# Patient Record
Sex: FEMALE | Race: WHITE | NOT HISPANIC OR LATINO | URBAN - METROPOLITAN AREA
[De-identification: names, ages, dates, MRNs, and addresses within clinical notes are randomized per-mention and may not be internally consistent; named-entity substitution may affect disease eponyms.]

---

## 2022-12-27 ENCOUNTER — PATIENT OUTREACH (OUTPATIENT)
Dept: ONCOLOGY | Facility: CLINIC | Age: 37
End: 2022-12-27

## 2022-12-27 ENCOUNTER — TRANSCRIBE ORDERS (OUTPATIENT)
Dept: OTHER | Age: 37
End: 2022-12-27

## 2022-12-27 DIAGNOSIS — N83.209 CYST OF OVARY, UNSPECIFIED LATERALITY: Primary | ICD-10-CM

## 2022-12-27 NOTE — PROGRESS NOTES
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  Normal appearing uterus and endometrium with possible echogenic   calcifications.   2.  Normal right ovary.   3.  Enlarged left ovary with multicystic areas noted measuring 18.0 x 12.8   x 8.5 cm.  Volume of the ovary is 1023 cm3.  There is no cul-de-sac fluid.    12/5 path/cytology:  Final Diagnosis   [facility]   A. LEFT OVARY, OOPHORECTOMY  --ADULT GRANULOSA CELL TUMOR  --SEE COMMENT     Diagnosis/Interpretation     A. PERITONEAL FLUID, WASHING  --NO TUMOR CELLS IDENTIFIED        12/5 Op note:  Laparoscopy, Diagnostic, Gyn, Left Salpingo-Oophorectomy, Possible Right Salpingectomy, Possible Laparotomy  -- BOOKMARKED      Clinical Assessment / Barriers to Care (Per Nurse):  Pt lives in [facility]    Records Location: Care Everywhere     Records Needed:   Imaging, path from [facility]    Additional testing needed prior to consult:   N/A    Referral updates and Plan:   OUTGOING CALL to pt:  Introduced my role as nurse navigator with [facility] Hematology/Oncology dept and that we have recd the referral for dx of Granulosa cell tumor of left ovary from Dr Gee  Pt confirms they are aware of the referral and ready to schedule  Provided contact information if future questions arise.     -Transferred pt to NPS line [phone] to schedule appt per scheduling instructions.      Melissa Goldstein, WILMAN, RN, PHN, OCN  Hematology/Oncology Nurse  [place]  [phone]

## 2023-01-02 NOTE — TELEPHONE ENCOUNTER
RECORDS STATUS - ALL OTHER DIAGNOSIS      RECORDS RECEIVED FROM: [facility]   DATE RECEIVED:    NOTES STATUS DETAILS   [facility]   OPERATIVE REPORT [facility] 22: Laparoscopy, Diagnostic, Gyn, Left Salpingo-Oophorectomy   MEDICATION LIST -[facility]    LABS     PATHOLOGY REPORTS Req -[facility]  [employer] 22: [id number]   ANYTHING RELATED TO DIAGNOSIS -[facility] Most recent 10/05/22   IMAGING (NEED IMAGES & REPORT)     ULTRASOUND PACS 10/11/22: US Pelvic

## 2023-01-03 NOTE — PROGRESS NOTES
GYNECOLOGIC  ONCOLOGY CONSULT        Referring provider:    Nir Gee  [facility]  [address] [place] [address]   RE: Darcie Cason  : 1985  YAHIR: 2023    CC: Adult Granulosa Cell Tumor    HPI: Ms Darcie Cason is a 37 year old female who presents for consultation regarding newly diagnosed granulosa cell tumor of ovary.    She presents today with her mother and .  She had a year of amenorrhea and noted the ovarian cyst increased in size between May and 2022. Since the recent surgery, her menstrual cycle returned, she is at the end of her cycle. She feels like she is recovering well from surgery, no pain. She feels good about her fertility and is currently not planning for pregnancy, she does want to retain ovarian function if it is safe    She presented to Dr. Corley (OB/GYN) for concerns of amenorrhea and a pelvic mass in 10/222. She had a LMP in 2021.     2022 Pelvic US- 12.7 x 12.3 cm complex left ovarian mass, endometrial thickness of 12.5 mm.    10/11/22 Pelvic US - 18 x 12.8 x 8.5 cm    22 Surgery: Laparoscopy, Diagnostic, Gyn, Left Salpingo-Oophorectomy ( intraoperative finding of smooth exterior, the mass was drained with needle drain, cut in two and removed via endo catch bag)   Cytology: Negative for malignancy  Pathology: Final Diagnosis  A. LEFT OVARY, OOPHORECTOMY  --ADULT GRANULOSA CELL TUMOR  --SEE COMMENT      OBGYN history and Health Maintenance:    Last Pap Smear: no abnormal history      Review of Systems:  Systemic:No weight changes.    Skin : No skin changes or new lesions.   Eye : No changes in vision.   Pulmonary: No cough or SOB.   Cardiovascular: No CP or palpitations  Gastrointestinal : No diarrhea, constipation, abdominal pain. Bowel habits normal.   Genitourinary: No dysuria, urgency or bleeding  Psychiatric: No depression or anxiety  Hematologic : No palpable lymph nodes.   Endocrine : No hot  "flashes. No heat/cold intolerance.      Neurological: No headaches, no numbness.     No past medical history on file.    Past Surgical History:   Procedure Laterality Date     OTHER SURGICAL HISTORY      04604.0,PAST SURGICAL HISTORY,Unremarkable          No current outpatient medications on file.         Not on File    Social History:  Social History     Tobacco Use     Smoking status: Not on file     Smokeless tobacco: Not on file   Substance Use Topics     Alcohol use: Not on file         Family History:   The patient's family history is notable for   Family History   Problem Relation Age of Onset     Genetic Disorder Other         Genetic     Family History Negative Mother         Good Health     Family History Negative Father         Good Health     Family History Negative Other         Good Health         Physical Exam:     /83 (BP Location: Right arm, Patient Position: Sitting, Cuff Size: Adult Regular)   Pulse 85   Temp 98.3  F (36.8  C) (Oral)   Resp 16   Ht 1.724 m (5' 7.87\")   Wt 58.3 kg (128 lb 9.6 oz)   SpO2 100%   BMI 19.63 kg/m    Body mass index is 19.63 kg/m .    General: Alert and oriented, no acute distress  Psych: Mood stable  Cardiovascular: RRR, no murmors  Pulmonary: Lungs clear . Normal respiratory effort  GI: No distention. No masses. No hernia. Small incision, healing, no erythema  : Normal external genitalia. Vagina and cervix without lesions    Endometrial Biopsy was performed as follows: A speculum placed in   vagina with good visualization of cervix; cervix swabbed x3 with   Betadine solution. Anterior lip of cervix grasped with single   toothed tenaculum; endometrial sampling achieved with Pipelle   sampling unit. Tissue collected, labelled and sent to Pathology.   Instruments removed, hemostasis achieved with ease. The patient   tolerated the procedure well.      Assessment: Darcie Cason is a 37 year old woman with a new diagnosis of presumed Stage IC1 granulosa cell " tumor of ovary. She is recovering well from recent surgery.    I reviewed that granulosa cell tumor overall has good prognosis. Additional surgery maybe warranted depending on additional testing. If there is no evidence of metastasis, it is reasonable to retain the contralateral ovary and uterus.    As there is an increased risk of endometrial hyperplasia/atypia- we obtained an endometrial biopsy today      Plan:     1.)   Labs today- Inhibin B, endometrial biopsy and Ct scan of abdomen and pelvis. Follow up in 2-3 weeks to review results. Will also obtain outside pathology review. We will discuss benefits and risks of surgery at next visit.      2.) Genetic risk factors were assessed: she is interested in genetic counseling as both her parents passed away from cancer       Ashley Rodgers M.D., MPH,  F.A.C.O.G.  Professor  Department of Ob/Gyn and Women's Health  Division of Gynecologic Oncology  [facility]  [phone]        Time: total time spent today, January 5, 2023, including preparation, review of outside records, face to face counseling, and documentation was 60 minutes.

## 2023-01-05 ENCOUNTER — ONCOLOGY VISIT (OUTPATIENT)
Dept: ONCOLOGY | Facility: CLINIC | Age: 38
End: 2023-01-05
Attending: OBSTETRICS & GYNECOLOGY
Payer: COMMERCIAL

## 2023-01-05 ENCOUNTER — PRE VISIT (OUTPATIENT)
Dept: ONCOLOGY | Facility: CLINIC | Age: 38
End: 2023-01-05

## 2023-01-05 ENCOUNTER — PATIENT OUTREACH (OUTPATIENT)
Dept: ONCOLOGY | Facility: CLINIC | Age: 38
End: 2023-01-05

## 2023-01-05 VITALS
TEMPERATURE: 98.3 F | DIASTOLIC BLOOD PRESSURE: 83 MMHG | OXYGEN SATURATION: 100 % | SYSTOLIC BLOOD PRESSURE: 139 MMHG | HEIGHT: 68 IN | BODY MASS INDEX: 19.49 KG/M2 | RESPIRATION RATE: 16 BRPM | WEIGHT: 128.6 LBS | HEART RATE: 85 BPM

## 2023-01-05 DIAGNOSIS — N93.8 DYSFUNCTIONAL UTERINE BLEEDING: ICD-10-CM

## 2023-01-05 DIAGNOSIS — N83.209 CYST OF OVARY, UNSPECIFIED LATERALITY: ICD-10-CM

## 2023-01-05 DIAGNOSIS — D39.10 PRIMARY GRANULOSA CELL TUMOR OF OVARY, UNSPECIFIED LATERALITY: Primary | ICD-10-CM

## 2023-01-05 PROCEDURE — G0463 HOSPITAL OUTPT CLINIC VISIT: HCPCS

## 2023-01-05 PROCEDURE — 36415 COLL VENOUS BLD VENIPUNCTURE: CPT | Performed by: OBSTETRICS & GYNECOLOGY

## 2023-01-05 PROCEDURE — G0463 HOSPITAL OUTPT CLINIC VISIT: HCPCS | Performed by: OBSTETRICS & GYNECOLOGY

## 2023-01-05 PROCEDURE — 99205 OFFICE O/P NEW HI 60 MIN: CPT | Performed by: OBSTETRICS & GYNECOLOGY

## 2023-01-05 PROCEDURE — 88305 TISSUE EXAM BY PATHOLOGIST: CPT | Mod: 26 | Performed by: PATHOLOGY

## 2023-01-05 PROCEDURE — 88305 TISSUE EXAM BY PATHOLOGIST: CPT | Mod: TC | Performed by: OBSTETRICS & GYNECOLOGY

## 2023-01-05 PROCEDURE — 83520 IMMUNOASSAY QUANT NOS NONAB: CPT | Performed by: OBSTETRICS & GYNECOLOGY

## 2023-01-05 NOTE — NURSING NOTE
Labs drawn on patient per provider request. Patient tolerated blood draw without any incident.     See Flowsheets.       Cindi Smart (Shama), LPN January 5, 2023 12:57 PM

## 2023-01-05 NOTE — PATIENT INSTRUCTIONS
Diagnosis: Adult Granulosa Cell Tumor    Plan:    We did a biopsy of the uterus (endometrial biopsy today), I will discuss the results with you during follow up visit  Lab- Inhibin B today  Schedule a CT scan of Abdomen and Pelvis on the week of January 16th  Follow up for virtual clinic on January 26th

## 2023-01-07 LAB — INHIBIN B SERPL-MCNC: 13 PG/ML

## 2023-01-09 NOTE — TELEPHONE ENCOUNTER
Action    Action Taken 1/9/23  Slides from Cedric PAGE received, taken to 5th floor lab @ [facility]  3:20 PM

## 2023-01-11 LAB
PATH REPORT.COMMENTS IMP SPEC: NORMAL
PATH REPORT.COMMENTS IMP SPEC: NORMAL
PATH REPORT.FINAL DX SPEC: NORMAL
PATH REPORT.GROSS SPEC: NORMAL
PATH REPORT.MICROSCOPIC SPEC OTHER STN: NORMAL
PATH REPORT.RELEVANT HX SPEC: NORMAL
PHOTO IMAGE: NORMAL

## 2023-01-12 ENCOUNTER — LAB REQUISITION (OUTPATIENT)
Dept: LAB | Facility: CLINIC | Age: 38
End: 2023-01-12
Payer: COMMERCIAL

## 2023-01-12 LAB
PATH REPORT.COMMENTS IMP SPEC: ABNORMAL
PATH REPORT.COMMENTS IMP SPEC: YES
PATH REPORT.FINAL DX SPEC: ABNORMAL
PATH REPORT.GROSS SPEC: ABNORMAL
PATH REPORT.MICROSCOPIC SPEC OTHER STN: ABNORMAL
PATH REPORT.RELEVANT HX SPEC: ABNORMAL
PATH REPORT.RELEVANT HX SPEC: ABNORMAL
PATH REPORT.SITE OF ORIGIN SPEC: ABNORMAL

## 2023-01-12 PROCEDURE — 88321 CONSLTJ&REPRT SLD PREP ELSWR: CPT | Mod: GC | Performed by: PATHOLOGY

## 2023-01-26 ENCOUNTER — PATIENT OUTREACH (OUTPATIENT)
Dept: ONCOLOGY | Facility: CLINIC | Age: 38
End: 2023-01-26

## 2023-01-26 ENCOUNTER — VIRTUAL VISIT (OUTPATIENT)
Dept: ONCOLOGY | Facility: CLINIC | Age: 38
End: 2023-01-26
Attending: OBSTETRICS & GYNECOLOGY
Payer: COMMERCIAL

## 2023-01-26 DIAGNOSIS — D39.10 PRIMARY GRANULOSA CELL TUMOR OF OVARY, UNSPECIFIED LATERALITY: Primary | ICD-10-CM

## 2023-01-26 PROCEDURE — G0463 HOSPITAL OUTPT CLINIC VISIT: HCPCS | Mod: PN,GT | Performed by: OBSTETRICS & GYNECOLOGY

## 2023-01-26 PROCEDURE — 99214 OFFICE O/P EST MOD 30 MIN: CPT | Mod: GT | Performed by: OBSTETRICS & GYNECOLOGY

## 2023-01-26 NOTE — PROGRESS NOTES
Inhibin B order faxed to local team to be completed in 3 months     This information was placed on cover sheet    Saundra Lou RN

## 2023-01-26 NOTE — PROGRESS NOTES
Darcie is a 37 year old who is being evaluated via a billable video visit.      How would you like to obtain your AVS? MyChart  If the video visit is dropped, the invitation should be resent by: Text to cell phone: [phone]  Will anyone else be joining your video visit?  Yes  Supa text link sent    Patient confirms medications and allergies are accurate via patients echeck in completion, and or denies any changes since last reviewed/verified.     GERONIMO Donahue/LPN    Video-Visit Details    Type of service:  Video Visit     Originating Location (pt. Location): Home    Distant Location (provider location):  On-site  Platform used for Video Visit: [facility]      GYNECOLOGIC  ONCOLOGY CLINIC NOTE        Referring provider:    Nir Gee  [facility]  [address] [id number]   RE: Darcie Cason  : 1985  YAHIR: 2023      CC: Adult Granulosa Cell Tumor, likely Stage IC     HPI: Ms Darcie Cason is a 37 year old female who presents for follow up of granulosa cell tumor of left ovary.    Today she reports doing well. Since the surgery she has had two menstrual cycles. No other concerns    Work up to date  She had a year of amenorrhea and noted the ovarian cyst increased in size between May and 2022. Since the recent surgery, her menstrual cycle returned, she is at the end of her cycle. She feels like she is recovering well from surgery, no pain. She feels good about her fertility and is currently not planning for pregnancy, she does want to retain ovarian function if it is safe    She presented to Dr. Corley (OB/GYN) for concerns of amenorrhea and a pelvic mass in 10/222. She had a LMP in 2021.     2022 Pelvic US- 12.7 x 12.3 cm complex left ovarian mass, endometrial thickness of 12.5 mm.    10/11/22 Pelvic US - 18 x 12.8 x 8.5 cm    22 Surgery: Laparoscopy, Diagnostic, Gyn, Left Salpingo-Oophorectomy ( intraoperative  finding of smooth exterior, the mass was drained with needle drain, cut in two and removed via endo catch bag)   Cytology: Negative for malignancy  Pathology: Final Diagnosis  A. LEFT OVARY, OOPHORECTOMY  --ADULT GRANULOSA CELL TUMOR  --SEE COMMENT    23 Inhibin B 13    23 Endometrial Biopsy  Final Diagnosis  Endometrium, biopsy:  - Weakly proliferative endometrium with focal stromal breakdown  - Negative for hyperplasia, atypia or malignancy    23 CT A/P  IMPRESSION:   1. Previously noted left ovarian mass no longer visualized, presumably surgically absent.  Grossly unremarkable appearance of the right ovary. 2. Trace pelvic free fluid likely physiologic.      OBGYN history and Health Maintenance:    Last Pap Smear: no abnormal history      Review of Systems:  Systemic:No weight changes.    Skin : No skin changes or new lesions.   Eye : No changes in vision.   Pulmonary: No cough or SOB.   Cardiovascular: No CP or palpitations  Gastrointestinal : No diarrhea, constipation, abdominal pain. Bowel habits normal.   Genitourinary: No dysuria, urgency or bleeding  Psychiatric: No depression or anxiety  Hematologic : No palpable lymph nodes.   Endocrine : No hot flashes. No heat/cold intolerance.      Neurological: No headaches, no numbness.     No past medical history on file.    Past Surgical History:   Procedure Laterality Date     OTHER SURGICAL HISTORY      02671.0,PAST SURGICAL HISTORY,Unremarkable          No current outpatient medications on file.         No Known Allergies    Social History:  Social History     Tobacco Use     Smoking status: Never     Smokeless tobacco: Never   Substance Use Topics     Alcohol use: Not on file         Family History:   The patient's family history is notable for   Family History   Problem Relation Age of Onset     Leukemia Mother      Cancer Father      Deep Vein Thrombosis (DVT) Father      Family History Negative Other         Good Health     Genetic  Disorder Other         Genetic         Physical Exam:   Virtual Visit  General: Alert and oriented, no acute distress  Psych: Mood stable      Assessment: Darcie Cason is a 37 year old woman with a 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  Her recent work up shows no residual disease.    Discussed that there is small risk of recurrence with granulosa cell tumor. Given her age it is reasonable to retain the right ovary and initiate tumor surveillance visit. Amenorrhea was clinical sign in her original diagnosis so she should follow up with any changes in her menstrual cycle for evaluation.    Plan for Inhibin B level with every visit    Schedule tumor surveillance every 3 months for 2 years and then every 6 months to complete a 5 year follow up.    Plan:     1.)   Follow up in 3 months for tumor surveillance visit     2.) Genetic risk factors were assessed: she is interested in genetic counseling as both her parents passed away from cancer, awaiting further evaluation       Ashley Rodgers M.D., MPH,  F.A.C.O.G.  Professor  Department of Ob/Gyn and Women's Health  Division of Gynecologic Oncology  [facility]  [phone]        Time: total time spent today, January 26, 2023, including preparation, review of outside records, face to face counseling, and documentation was 30 minutes.

## 2023-01-26 NOTE — LETTER
2023         RE: Darcie Cason  [id number]  [facility] [address]        Dear Colleague,    Thank you for referring your patient, Darcie Cason, to the [facility]. Please see a copy of my visit note below      GYNECOLOGIC  ONCOLOGY CLINIC NOTE        Referring provider:    Nir Gee  [facility]  [address] [place] [address]   RE: Darcie Cason  : 1985  YAHIR: 2023      CC: Adult Granulosa Cell Tumor, likely Stage IC     HPI: Ms Darcie Cason is a 37 year old female who presents for follow up of granulosa cell tumor of left ovary.    Today she reports doing well. Since the surgery she has had two menstrual cycles. No other concerns    Work up to date  She had a year of amenorrhea and noted the ovarian cyst increased in size between May and 2022. Since the recent surgery, her menstrual cycle returned, she is at the end of her cycle. She feels like she is recovering well from surgery, no pain. She feels good about her fertility and is currently not planning for pregnancy, she does want to retain ovarian function if it is safe    She presented to Dr. Corley (OB/GYN) for concerns of amenorrhea and a pelvic mass in 10/222. She had a LMP in 2021.     2022 Pelvic US- 12.7 x 12.3 cm complex left ovarian mass, endometrial thickness of 12.5 mm.    10/11/22 Pelvic US - 18 x 12.8 x 8.5 cm    22 Surgery: Laparoscopy, Diagnostic, Gyn, Left Salpingo-Oophorectomy ( intraoperative finding of smooth exterior, the mass was drained with needle drain, cut in two and removed via endo catch bag)   Cytology: Negative for malignancy  Pathology: Final Diagnosis  A. LEFT OVARY, OOPHORECTOMY  --ADULT GRANULOSA CELL TUMOR  --SEE COMMENT    23 Inhibin B 13    23 Endometrial Biopsy  Final Diagnosis  Endometrium, biopsy:  - Weakly proliferative endometrium with focal stromal breakdown  - Negative for hyperplasia, atypia or  malignancy    23 CT A/P  IMPRESSION:   1. Previously noted left ovarian mass no longer visualized, presumably surgically absent.  Grossly unremarkable appearance of the right ovary. 2. Trace pelvic free fluid likely physiologic.      OBGYN history and Health Maintenance:    Last Pap Smear: no abnormal history      Review of Systems:  Systemic:No weight changes.    Skin : No skin changes or new lesions.   Eye : No changes in vision.   Pulmonary: No cough or SOB.   Cardiovascular: No CP or palpitations  Gastrointestinal : No diarrhea, constipation, abdominal pain. Bowel habits normal.   Genitourinary: No dysuria, urgency or bleeding  Psychiatric: No depression or anxiety  Hematologic : No palpable lymph nodes.   Endocrine : No hot flashes. No heat/cold intolerance.      Neurological: No headaches, no numbness.     No past medical history on file.    Past Surgical History:   Procedure Laterality Date     OTHER SURGICAL HISTORY      72093.0,PAST SURGICAL HISTORY,Unremarkable          No current outpatient medications on file.         No Known Allergies    Social History:  Social History     Tobacco Use     Smoking status: Never     Smokeless tobacco: Never   Substance Use Topics     Alcohol use: Not on file         Family History:   The patient's family history is notable for   Family History   Problem Relation Age of Onset     Leukemia Mother      Cancer Father      Deep Vein Thrombosis (DVT) Father      Family History Negative Other         Good Health     Genetic Disorder Other         Genetic         Physical Exam:   Virtual Visit  General: Alert and oriented, no acute distress  Psych: Mood stable      Assessment: Darcie Cason is a 37 year old woman with a 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  Her recent work up shows no residual disease.    Discussed that there is small risk of recurrence with granulosa cell tumor. Given her age it is reasonable to retain the right ovary  and initiate tumor surveillance visit. Amenorrhea was clinical sign in her original diagnosis so she should follow up with any changes in her menstrual cycle for evaluation.    Plan for Inhibin B level with every visit    Schedule tumor surveillance every 3 months for 2 years and then every 6 months to complete a 5 year follow up.    Plan:     1.)   Follow up in 3 months for tumor surveillance visit     2.) Genetic risk factors were assessed: she is interested in genetic counseling as both her parents passed away from cancer, awaiting further evaluation       Ashley Rodgers M.D., MPH,  F.A.C.O.G.  Professor  Department of Ob/Gyn and Women's Health  Division of Gynecologic Oncology  [facility]/[employer] [place]  [phone]      Time: total time spent today, January 26, 2023, including preparation, review of outside records, face to face counseling, and documentation was 30 minutes.

## 2023-01-28 ENCOUNTER — HEALTH MAINTENANCE LETTER (OUTPATIENT)
Age: 38
End: 2023-01-28

## 2023-04-27 ENCOUNTER — VIRTUAL VISIT (OUTPATIENT)
Dept: ONCOLOGY | Facility: CLINIC | Age: 38
End: 2023-04-27
Attending: OBSTETRICS & GYNECOLOGY
Payer: COMMERCIAL

## 2023-04-27 DIAGNOSIS — R97.8 ELEVATED TUMOR MARKERS: Primary | ICD-10-CM

## 2023-04-27 DIAGNOSIS — D39.10 PRIMARY GRANULOSA CELL TUMOR OF OVARY, UNSPECIFIED LATERALITY: ICD-10-CM

## 2023-04-27 PROCEDURE — 99213 OFFICE O/P EST LOW 20 MIN: CPT | Mod: VID | Performed by: OBSTETRICS & GYNECOLOGY

## 2023-04-27 PROCEDURE — G0463 HOSPITAL OUTPT CLINIC VISIT: HCPCS | Mod: PN,GT | Performed by: OBSTETRICS & GYNECOLOGY

## 2023-04-27 NOTE — PROGRESS NOTES
Virtual Visit Details    Type of service:  Video Visit     Originating Location (pt. Location): Home    Distant Location (provider location):  On-site  Platform used for Video Visit: [facility]      GYNECOLOGIC  ONCOLOGY CLINIC NOTE        Referring provider:    Nir Gee  [facility]  [address] [place] [address]   RE: Darcie Cason  : 1985  YAHIR: 2023    CC: Adult Granulosa Cell Tumor, likely Stage IC     HPI: Ms Darcie Cason is a 38 year old female who presents for follow up of granulosa cell tumor of left ovary.    Today she reports doing well, LMP was early April, cycles last about 27-28 days. Denies any abnormal vaginal bleeding or pain. No other concerns    Work up to date  She had a year of amenorrhea and noted the ovarian cyst increased in size between May and 2022. Since the recent surgery, her menstrual cycle returned, she is at the end of her cycle. She feels like she is recovering well from surgery, no pain. She feels good about her fertility and is currently not planning for pregnancy, she does want to retain ovarian function if it is safe    She presented to Dr. Corley (OB/GYN) for concerns of amenorrhea and a pelvic mass in 10/222. She had a LMP in 2021.     2022 Pelvic US- 12.7 x 12.3 cm complex left ovarian mass, endometrial thickness of 12.5 mm.    10/11/22 Pelvic US - 18 x 12.8 x 8.5 cm    22 Surgery: Laparoscopy, Diagnostic, Gyn, Left Salpingo-Oophorectomy ( intraoperative finding of smooth exterior, the mass was drained with needle drain, cut in two and removed via endo catch bag)   Cytology: Negative for malignancy  Pathology: Final Diagnosis  A. LEFT OVARY, OOPHORECTOMY  --ADULT GRANULOSA CELL TUMOR  --SEE COMMENT    23 Inhibin B 13    23 Endometrial Biopsy  Final Diagnosis  Endometrium, biopsy:  - Weakly proliferative endometrium with focal stromal breakdown  - Negative for hyperplasia, atypia or  malignancy    23 CT A/P  IMPRESSION:   1. Previously noted left ovarian mass no longer visualized, presumably surgically absent.  Grossly unremarkable appearance of the right ovary. 2. Trace pelvic free fluid likely physiologic.    23 Inhibin B 72        OBGYN history and Health Maintenance:    Last Pap Smear: no abnormal history    Review of Systems:  Systemic:No weight changes.    Skin : No skin changes or new lesions.   Eye : No changes in vision.   Pulmonary: No cough or SOB.   Cardiovascular: No CP or palpitations  Gastrointestinal : No diarrhea, constipation, abdominal pain. Bowel habits normal.   Genitourinary: No dysuria, urgency or bleeding  Psychiatric: No depression or anxiety  Hematologic : No palpable lymph nodes.   Endocrine : No hot flashes. No heat/cold intolerance.      Neurological: No headaches, no numbness.     No past medical history on file.    Past Surgical History:   Procedure Laterality Date     OTHER SURGICAL HISTORY      17489.0,PAST SURGICAL HISTORY,Unremarkable       No current outpatient medications on file.       No Known Allergies    Social History:  Social History     Tobacco Use     Smoking status: Never     Smokeless tobacco: Never   Vaping Use     Vaping status: Not on file   Substance Use Topics     Alcohol use: Not on file         Family History:   The patient's family history is notable for   Family History   Problem Relation Age of Onset     Leukemia Mother      Cancer Father      Deep Vein Thrombosis (DVT) Father      Family History Negative Other         Good Health     Genetic Disorder Other         Genetic         Physical Exam:   Virtual Visit  General: Alert and oriented, no acute distress  Psych: Mood stable      Assessment: Darcie Cason is a 37 year old woman with a 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    Reviewed that Inhibin B in pre-menopausal women can fluctuate and when elevated we follow a trend.    Plan to obtain  Pelvic US and Inhibin value in about 3 weeks which will be post her next menstural cycle and virtual visit in 4 weeks to discuss results.     Genetic risk factors were assessed: will discuss further at next visit       Ashley Rodgers M.D., MPH,  F.A.C.O.G.  Professor  Department of Ob/Gyn and Women's Health  Division of Gynecologic Oncology  [facility]/[employer] [place]  [phone]        Time: total time spent today, April 27, 2023, including preparation, review of outside records, face to face counseling, and documentation was 25 minutes.

## 2023-04-27 NOTE — NURSING NOTE
Is the patient currently in the state of [place]? YES    Visit mode:VIDEO    If the visit is dropped, the patient can be reconnected by: VIDEO VISIT: Text to cell phone: [phone]    Will anyone else be joining the visit? NO      How would you like to obtain your AVS? MyChart    Are changes needed to the allergy or medication list? NO    Reason for visit: Video Visit (Return CCSL)      Radha Suazo VF

## 2023-04-27 NOTE — LETTER
2023         RE: Darcie Cason  [id number]  [facility] [address]        Dear Colleague,    Thank you for referring your patient, Darcie Cason, to the [facility]. Please see a copy of my visit note below.          GYNECOLOGIC  ONCOLOGY CLINIC NOTE        Referring provider:    Nir Gee  [facility]  [address] [place] [address]   RE: Darcie Cason  : 1985  YAHIR: 2023    CC: Adult Granulosa Cell Tumor, likely Stage IC     HPI: Ms Darcie Cason is a 38 year old female who presents for follow up of granulosa cell tumor of left ovary.    Today she reports doing well, LMP was early April, cycles last about 27-28 days. Denies any abnormal vaginal bleeding or pain. No other concerns    Work up to date  She had a year of amenorrhea and noted the ovarian cyst increased in size between May and 2022. Since the recent surgery, her menstrual cycle returned, she is at the end of her cycle. She feels like she is recovering well from surgery, no pain. She feels good about her fertility and is currently not planning for pregnancy, she does want to retain ovarian function if it is safe    She presented to Dr. Corley (OB/GYN) for concerns of amenorrhea and a pelvic mass in 10/222. She had a LMP in 2021.     2022 Pelvic US- 12.7 x 12.3 cm complex left ovarian mass, endometrial thickness of 12.5 mm.    10/11/22 Pelvic US - 18 x 12.8 x 8.5 cm    22 Surgery: Laparoscopy, Diagnostic, Gyn, Left Salpingo-Oophorectomy ( intraoperative finding of smooth exterior, the mass was drained with needle drain, cut in two and removed via endo catch bag)   Cytology: Negative for malignancy  Pathology: Final Diagnosis  A. LEFT OVARY, OOPHORECTOMY  --ADULT GRANULOSA CELL TUMOR  --SEE COMMENT    23 Inhibin B 13    23 Endometrial Biopsy  Final Diagnosis  Endometrium, biopsy:  - Weakly proliferative endometrium with focal stromal  breakdown  - Negative for hyperplasia, atypia or malignancy    23 CT A/P  IMPRESSION:   1. Previously noted left ovarian mass no longer visualized, presumably surgically absent.  Grossly unremarkable appearance of the right ovary. 2. Trace pelvic free fluid likely physiologic.    23 Inhibin B 72        OBGYN history and Health Maintenance:    Last Pap Smear: no abnormal history    Review of Systems:  Systemic:No weight changes.    Skin : No skin changes or new lesions.   Eye : No changes in vision.   Pulmonary: No cough or SOB.   Cardiovascular: No CP or palpitations  Gastrointestinal : No diarrhea, constipation, abdominal pain. Bowel habits normal.   Genitourinary: No dysuria, urgency or bleeding  Psychiatric: No depression or anxiety  Hematologic : No palpable lymph nodes.   Endocrine : No hot flashes. No heat/cold intolerance.      Neurological: No headaches, no numbness.     No past medical history on file.    Past Surgical History:   Procedure Laterality Date    OTHER SURGICAL HISTORY      35880.0,PAST SURGICAL HISTORY,Unremarkable       No current outpatient medications on file.       No Known Allergies    Social History:  Social History     Tobacco Use    Smoking status: Never    Smokeless tobacco: Never   Vaping Use    Vaping status: Not on file   Substance Use Topics    Alcohol use: Not on file         Family History:   The patient's family history is notable for   Family History   Problem Relation Age of Onset    Leukemia Mother     Cancer Father     Deep Vein Thrombosis (DVT) Father     Family History Negative Other         Good Health    Genetic Disorder Other         Genetic         Physical Exam:   Virtual Visit  General: Alert and oriented, no acute distress  Psych: Mood stable      Assessment: Darcie Cason is a 37 year old woman with a 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    Reviewed that Inhibin B in pre-menopausal women can fluctuate and when  elevated we follow a trend.    Plan to obtain Pelvic US and Inhibin value in about 3 weeks which will be post her next menstural cycle and virtual visit in 4 weeks to discuss results.     Genetic risk factors were assessed: will discuss further at next visit       Ashley Rodgers M.D., MPH,  F.A.C.O.G.  Professor  Department of Ob/Gyn and Women's Health  Division of Gynecologic Oncology  [facility]  [phone]        Time: total time spent today, April 27, 2023, including preparation, review of outside records, face to face counseling, and documentation was 25 minutes.

## 2023-05-23 NOTE — PROGRESS NOTES
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    Today she reports doing well, thinks her LMP will in 3-4 days.  Denies any abnormal vaginal bleeding or pain. No other concerns    Work up to date  She had a year of amenorrhea and noted the ovarian cyst increased in size between May and 2022. Since the recent surgery, her menstrual cycle returned, she is at the end of her cycle. She feels like she is recovering well from surgery, no pain. She feels good about her fertility and is currently not planning for pregnancy, she does want to retain ovarian function if it is safe    She presented to Dr. Corley (OB/GYN) for concerns of amenorrhea and a pelvic mass in 10/222. She had a LMP in 2021.     2022 Pelvic US- 12.7 x 12.3 cm complex left ovarian mass, endometrial thickness of 12.5 mm.    10/11/22 Pelvic US - 18 x 12.8 x 8.5 cm    22 Surgery: Laparoscopy, Diagnostic, Gyn, Left Salpingo-Oophorectomy ( intraoperative finding of smooth exterior, the mass was drained with needle drain, cut in two and removed via endo catch bag)   Cytology: Negative for malignancy  Pathology: Final Diagnosis  A. LEFT OVARY, OOPHORECTOMY  --ADULT GRANULOSA CELL TUMOR  --SEE COMMENT    23 Inhibin B 13    23 Endometrial Biopsy  Final Diagnosis  Endometrium, biopsy:  - Weakly proliferative endometrium with focal stromal breakdown  - Negative for hyperplasia, atypia or malignancy    23 CT A/P  IMPRESSION:   1. Previously noted left ovarian mass no longer visualized, presumably surgically absent.  Grossly unremarkable appearance of the right ovary. 2. Trace pelvic free fluid likely  physiologic.    23 Inhibin B 72     23 Pelvic US  Right Ovary 3.7 x 3.8 x 2.8 cm, two complex cyst 1.8 x 1.7 cm and 2.3 x 2 cm     23 Inhibin B 12      OBGYN history and Health Maintenance:    Last Pap Smear: no abnormal history    Review of Systems:  Systemic:No weight changes.    Skin : No skin changes or new lesions.   Eye : No changes in vision.   Pulmonary: No cough or SOB.   Cardiovascular: No CP or palpitations  Gastrointestinal : No diarrhea, constipation, abdominal pain. Bowel habits normal.   Genitourinary: No dysuria, urgency or bleeding  Psychiatric: No depression or anxiety  Hematologic : No palpable lymph nodes.   Endocrine : No hot flashes. No heat/cold intolerance.      Neurological: No headaches, no numbness.     No past medical history on file.    Past Surgical History:   Procedure Laterality Date     OTHER SURGICAL HISTORY      17324.0,PAST SURGICAL HISTORY,Unremarkable       No current outpatient medications on file.       No Known Allergies    Social History:  Social History     Tobacco Use     Smoking status: Never     Smokeless tobacco: Never   Vaping Use     Vaping status: Not on file   Substance Use Topics     Alcohol use: Not on file         Family History:   The patient's family history is notable for   Family History   Problem Relation Age of Onset     Leukemia Mother      Cancer Father      Deep Vein Thrombosis (DVT) Father      Family History Negative Other         Good Health     Genetic Disorder Other         Genetic         Physical Exam:   Virtual Visit  General: Alert and oriented, no acute distress  Psych: Mood stable      Assessment: Darcie Cason is a 38 year old woman with  diagnosis of 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    Repeat Inhibin B level is within normal value an Pelvic US shows likely functional cyst.    Follow up in 6 months with Inhibin B        Ashley Rodgers M.D., MPH,  F.A.C.O.G.  Professor  Department of Ob/Gyn and Women's  Health  Division of Gynecologic Oncology  [facility]  [phone]        Time: total time spent today, 5/25/23, including preparation, review of outside records, face to face counseling, and documentation was 20 minutes.

## 2023-05-25 ENCOUNTER — VIRTUAL VISIT (OUTPATIENT)
Dept: ONCOLOGY | Facility: CLINIC | Age: 38
End: 2023-05-25
Attending: OBSTETRICS & GYNECOLOGY
Payer: COMMERCIAL

## 2023-05-25 DIAGNOSIS — D39.10 PRIMARY GRANULOSA CELL TUMOR OF OVARY, UNSPECIFIED LATERALITY: Primary | ICD-10-CM

## 2023-05-25 PROCEDURE — 99213 OFFICE O/P EST LOW 20 MIN: CPT | Mod: VID | Performed by: OBSTETRICS & GYNECOLOGY

## 2023-05-25 NOTE — LETTER
2023         RE: Darcie Cason  [id number]  [facility] [address]        Dear Colleague,    Thank you for referring your patient, Darcie Cason, to the [facility] CANCER [facility]INIC. Please see a copy of my visit note below.    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    Today she reports doing well, thinks her LMP will in 3-4 days.  Denies any abnormal vaginal bleeding or pain. No other concerns    Work up to date  She had a year of amenorrhea and noted the ovarian cyst increased in size between May and 2022. Since the recent surgery, her menstrual cycle returned, she is at the end of her cycle. She feels like she is recovering well from surgery, no pain. She feels good about her fertility and is currently not planning for pregnancy, she does want to retain ovarian function if it is safe    She presented to Dr. Corley (OB/GYN) for concerns of amenorrhea and a pelvic mass in 10/222. She had a LMP in 2021.     2022 Pelvic US- 12.7 x 12.3 cm complex left ovarian mass, endometrial thickness of 12.5 mm.    10/11/22 Pelvic US - 18 x 12.8 x 8.5 cm    22 Surgery: Laparoscopy, Diagnostic, Gyn, Left Salpingo-Oophorectomy ( intraoperative finding of smooth exterior, the mass was drained with needle drain, cut in two and removed via endo catch bag)   Cytology: Negative for malignancy  Pathology: Final Diagnosis  A. LEFT OVARY, OOPHORECTOMY  --ADULT GRANULOSA CELL TUMOR  --SEE COMMENT    23 Inhibin B 13    23 Endometrial Biopsy  Final Diagnosis  Endometrium, biopsy:  - Weakly proliferative endometrium with focal stromal breakdown  - Negative for  hyperplasia, atypia or malignancy    23 CT A/P  IMPRESSION:   1. Previously noted left ovarian mass no longer visualized, presumably surgically absent.  Grossly unremarkable appearance of the right ovary. 2. Trace pelvic free fluid likely physiologic.    23 Inhibin B 72     23 Pelvic US  Right Ovary 3.7 x 3.8 x 2.8 cm, two complex cyst 1.8 x 1.7 cm and 2.3 x 2 cm     23 Inhibin B 12      OBGYN history and Health Maintenance:    Last Pap Smear: no abnormal history    Review of Systems:  Systemic:No weight changes.    Skin : No skin changes or new lesions.   Eye : No changes in vision.   Pulmonary: No cough or SOB.   Cardiovascular: No CP or palpitations  Gastrointestinal : No diarrhea, constipation, abdominal pain. Bowel habits normal.   Genitourinary: No dysuria, urgency or bleeding  Psychiatric: No depression or anxiety  Hematologic : No palpable lymph nodes.   Endocrine : No hot flashes. No heat/cold intolerance.      Neurological: No headaches, no numbness.     No past medical history on file.    Past Surgical History:   Procedure Laterality Date    OTHER SURGICAL HISTORY      38603.0,PAST SURGICAL HISTORY,Unremarkable       No current outpatient medications on file.       No Known Allergies    Social History:  Social History     Tobacco Use    Smoking status: Never    Smokeless tobacco: Never   Vaping Use    Vaping status: Not on file   Substance Use Topics    Alcohol use: Not on file         Family History:   The patient's family history is notable for   Family History   Problem Relation Age of Onset    Leukemia Mother     Cancer Father     Deep Vein Thrombosis (DVT) Father     Family History Negative Other         Good Health    Genetic Disorder Other         Genetic         Physical Exam:   Virtual Visit  General: Alert and oriented, no acute distress  Psych: Mood stable      Assessment: Darcie Cason is a 38 year old woman with  diagnosis of presumed Stage IC1 granulosa cell tumor  of ovary.    Repeat Inhibin B level is within normal value an Pelvic US shows likely functional cyst.    Follow up in 6 months with Inhibin B        Ashley Rodgers M.D., MPH,  F.A.CJosephOLEROY.  Professor  Department of Ob/Gyn and Women's Health  Division of Gynecologic Oncology  [facility]/[place] [place]  [phone]        Time: total time spent today, 5/25/23, including preparation, review of outside records, face to face counseling, and documentation was 20 minutes.       Again, thank you for allowing me to participate in the care of your patient.        Sincerely,        Ashley Rodgers MD

## 2023-05-25 NOTE — NURSING NOTE
Is the patient currently in the state of [place]? YES    Visit mode:VIDEO    If the visit is dropped, the patient can be reconnected by: VIDEO VISIT: Text to cell phone: [phone]    Will anyone else be joining the visit? NO      How would you like to obtain your AVS? MyChart    Are changes needed to the allergy or medication list? NO    Reason for visit: RECHECK    Luis Antonio MELTON

## 2023-07-30 ENCOUNTER — HEALTH MAINTENANCE LETTER (OUTPATIENT)
Age: 38
End: 2023-07-30

## 2023-11-16 ENCOUNTER — VIRTUAL VISIT (OUTPATIENT)
Dept: ONCOLOGY | Facility: CLINIC | Age: 38
End: 2023-11-16
Attending: OBSTETRICS & GYNECOLOGY
Payer: COMMERCIAL

## 2023-11-16 VITALS — HEIGHT: 68 IN | WEIGHT: 127 LBS | BODY MASS INDEX: 19.25 KG/M2

## 2023-11-16 DIAGNOSIS — D39.10 PRIMARY GRANULOSA CELL TUMOR OF OVARY, UNSPECIFIED LATERALITY: Primary | ICD-10-CM

## 2023-11-16 PROCEDURE — 99213 OFFICE O/P EST LOW 20 MIN: CPT | Mod: VID | Performed by: OBSTETRICS & GYNECOLOGY

## 2023-11-16 ASSESSMENT — PAIN SCALES - GENERAL: PAINLEVEL: NO PAIN (0)

## 2023-11-16 NOTE — NURSING NOTE
Is the patient currently in the state of [place]? YES    Visit mode:VIDEO    If the visit is dropped, the patient can be reconnected by: VIDEO VISIT: Text to cell phone:   Telephone Information:   Mobile [phone]       Will anyone else be joining the visit? NO  (If patient encounters technical issues they should call [phone]    How would you like to obtain your AVS? MyChart    Are changes needed to the allergy or medication list? No    Reason for visit: RECHECK    Shelly COTE

## 2023-11-16 NOTE — PROGRESS NOTES
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    Today she reports doing well, LMP in  She notes her cycles are shorter these days, around 25 days, no abnormal spotting.Denies any abnormal vaginal bleeding or pain. No other concerns    Work up to date  She had a year of amenorrhea and noted the ovarian cyst increased in size between May and 2022. Since the recent surgery, her menstrual cycle returned, she is at the end of her cycle. She feels like she is recovering well from surgery, no pain. She feels good about her fertility and is currently not planning for pregnancy, she does want to retain ovarian function if it is safe    She presented to Dr. Corley (OB/GYN) for concerns of amenorrhea and a pelvic mass in 10/222. She had a LMP in 2021.     2022 Pelvic US- 12.7 x 12.3 cm complex left ovarian mass, endometrial thickness of 12.5 mm.    10/11/22 Pelvic US - 18 x 12.8 x 8.5 cm    22 Surgery: Laparoscopy, Diagnostic, Gyn, Left Salpingo-Oophorectomy ( intraoperative finding of smooth exterior, the mass was drained with needle drain, cut in two and removed via endo catch bag)   Cytology: Negative for malignancy  Pathology: Final Diagnosis  A. LEFT OVARY, OOPHORECTOMY  --ADULT GRANULOSA CELL TUMOR  --SEE COMMENT    23 Inhibin B 13    23 Endometrial Biopsy  Final Diagnosis  Endometrium, biopsy:  - Weakly proliferative endometrium with focal stromal breakdown  - Negative for hyperplasia, atypia or malignancy    23 CT A/P  IMPRESSION:   1. Previously noted left ovarian mass no longer visualized, presumably surgically absent.  Grossly  unremarkable appearance of the right ovary. 2. Trace pelvic free fluid likely physiologic.    23 Inhibin B 72     23 Pelvic US  Right Ovary 3.7 x 3.8 x 2.8 cm, two complex cyst 1.8 x 1.7 cm and 2.3 x 2 cm     23 Inhibin B 12  23 Pelvic US  FINDINGS: 2 complex area on the right ovary measuring 1.8 x 1.7 x 1.6 cm and 2.2 x 2 x 1.7 cm. The endometrial stripe is 7.2 mm with some very small hyperechoic areas adjacent to the anterior endometrial cavity.    23 Inhibin B  39      OBGYN history and Health Maintenance:    Last Pap Smear: no abnormal history      No past medical history on file.    Past Surgical History:   Procedure Laterality Date    OTHER SURGICAL HISTORY      84035.0,PAST SURGICAL HISTORY,Unremarkable       No current outpatient medications on file.       No Known Allergies    Social History:  Social History     Tobacco Use    Smoking status: Never    Smokeless tobacco: Never   Substance Use Topics    Alcohol use: Not on file         Family History:   The patient's family history is notable for   Family History   Problem Relation Age of Onset    Leukemia Mother     Cancer Father     Deep Vein Thrombosis (DVT) Father     Family History Negative Other         Good Health    Genetic Disorder Other         Genetic         Physical Exam:   Virtual Visit  General: Alert and oriented, no acute distress  Psych: Mood stable      Assessment: Darcie Cason is a 38 year old woman with  diagnosis of 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    Will obtain Pelvic US to follow up on most recent Inhibin level, likely the number fluctuates due to normal menstrual cycle.    Follow up in 6 months with Inhibin B and Pelvic US at that time      Ashley Rodgers M.D., MPH,  F.A.C.O.G.  Professor  Department of Ob/Gyn and Women's Health  Division of Gynecologic Oncology  [facility]/[employer] [place]  [phone]        Time: total time spent today, 2023, including  preparation, review of outside records, face to face counseling, and documentation was 20 minutes.

## 2023-11-16 NOTE — PROGRESS NOTES
Virtual Visit Details    Type of service:  Video Visit     Originating Location (pt. Location): Home    Distant Location (provider location):  On-site  Platform used for Video Visit: Aleksandra

## 2023-11-16 NOTE — LETTER
2023         RE: Darcie Cason  [id number]  [facility] [address]        Dear Colleague,    Thank you for referring your patient, Darcie Cason, to the [facility] CANCER CLINIC. Please see a copy of my visit note below.    GYNECOLOGIC  ONCOLOGY CLINIC NOTE        Referring provider:    Nir Gee  [facility]  [address] [place] [address]   RE: Darcie Cason  : 1985  YAHIR: 2023      CC: Adult Granulosa Cell Tumor, likely Stage IC     HPI: Ms Darcie Cason is a 38 year old female who presents for follow up of granulosa cell tumor of left ovary.    Today she reports doing well, LMP in  She notes her cycles are shorter these days, around 25 days, no abnormal spotting.Denies any abnormal vaginal bleeding or pain. No other concerns    Work up to date  She had a year of amenorrhea and noted the ovarian cyst increased in size between May and 2022. Since the recent surgery, her menstrual cycle returned, she is at the end of her cycle. She feels like she is recovering well from surgery, no pain. She feels good about her fertility and is currently not planning for pregnancy, she does want to retain ovarian function if it is safe    She presented to Dr. Corley (OB/GYN) for concerns of amenorrhea and a pelvic mass in 10/222. She had a LMP in 2021.     2022 Pelvic US- 12.7 x 12.3 cm complex left ovarian mass, endometrial thickness of 12.5 mm.    10/11/22 Pelvic US - 18 x 12.8 x 8.5 cm    22 Surgery: Laparoscopy, Diagnostic, Gyn, Left Salpingo-Oophorectomy ( intraoperative finding of smooth exterior, the mass was drained with needle drain, cut in two and removed via endo catch bag)   Cytology: Negative for malignancy  Pathology: Final Diagnosis  A. LEFT OVARY, OOPHORECTOMY  --ADULT GRANULOSA CELL TUMOR  --SEE COMMENT    23 Inhibin B 13    23 Endometrial Biopsy  Final Diagnosis  Endometrium, biopsy:  -  Weakly proliferative endometrium with focal stromal breakdown  - Negative for hyperplasia, atypia or malignancy    23 CT A/P  IMPRESSION:   1. Previously noted left ovarian mass no longer visualized, presumably surgically absent.  Grossly unremarkable appearance of the right ovary. 2. Trace pelvic free fluid likely physiologic.    23 Inhibin B 72     23 Pelvic US  Right Ovary 3.7 x 3.8 x 2.8 cm, two complex cyst 1.8 x 1.7 cm and 2.3 x 2 cm     23 Inhibin B 12  23 Pelvic US  FINDINGS: 2 complex area on the right ovary measuring 1.8 x 1.7 x 1.6 cm and 2.2 x 2 x 1.7 cm. The endometrial stripe is 7.2 mm with some very small hyperechoic areas adjacent to the anterior endometrial cavity.    23 Inhibin B  39      OBGYN history and Health Maintenance:    Last Pap Smear: no abnormal history      No past medical history on file.    Past Surgical History:   Procedure Laterality Date    OTHER SURGICAL HISTORY      78913.0,PAST SURGICAL HISTORY,Unremarkable       No current outpatient medications on file.       No Known Allergies    Social History:  Social History     Tobacco Use    Smoking status: Never    Smokeless tobacco: Never   Substance Use Topics    Alcohol use: Not on file         Family History:   The patient's family history is notable for   Family History   Problem Relation Age of Onset    Leukemia Mother     Cancer Father     Deep Vein Thrombosis (DVT) Father     Family History Negative Other         Good Health    Genetic Disorder Other         Genetic         Physical Exam:   Virtual Visit  General: Alert and oriented, no acute distress  Psych: Mood stable      Assessment: Darcie Cason is a 38 year old woman with  diagnosis of 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    Will obtain Pelvic US to follow up on most recent Inhibin level, likely the number fluctuates due to normal menstrual cycle.    Follow up in 6 months with Inhibin B and Pelvic US at that  time      Ashley Rodgers M.D., MPH,  JOSEPH  Professor  Department of Ob/Gyn and Women's Health  Division of Gynecologic Oncology  [facility]/[employer] [place]  [phone]        Time: total time spent today, November 16, 2023, including preparation, review of outside records, face to face counseling, and documentation was 20 minutes.

## 2023-11-16 NOTE — PATIENT INSTRUCTIONS
Schedule a Pelvic US now  Follow up in 6 months with Dr. Rodgers, with new Inhibin B lab and Pelvic US done at that time  Call with any abnormal vaginal bleeding or pelvic pain      Ashley Rodgers MD

## 2023-12-28 ENCOUNTER — PATIENT OUTREACH (OUTPATIENT)
Dept: ONCOLOGY | Facility: CLINIC | Age: 38
End: 2023-12-28
Payer: COMMERCIAL

## 2023-12-28 NOTE — TELEPHONE ENCOUNTER
MD reached out to patient to review US    All  questions answered     Patient will follow up as planned     Saundra Lou RN

## 2024-05-10 ENCOUNTER — ANCILLARY PROCEDURE (OUTPATIENT)
Dept: ULTRASOUND IMAGING | Facility: CLINIC | Age: 39
End: 2024-05-10
Attending: OBSTETRICS & GYNECOLOGY
Payer: COMMERCIAL

## 2024-05-10 ENCOUNTER — LAB (OUTPATIENT)
Dept: LAB | Facility: CLINIC | Age: 39
End: 2024-05-10
Attending: OBSTETRICS & GYNECOLOGY
Payer: COMMERCIAL

## 2024-05-10 DIAGNOSIS — D39.10 PRIMARY GRANULOSA CELL TUMOR OF OVARY, UNSPECIFIED LATERALITY: ICD-10-CM

## 2024-05-10 PROCEDURE — 83520 IMMUNOASSAY QUANT NOS NONAB: CPT | Mod: 90 | Performed by: PATHOLOGY

## 2024-05-10 PROCEDURE — 76856 US EXAM PELVIC COMPLETE: CPT | Mod: GC | Performed by: RADIOLOGY

## 2024-05-10 PROCEDURE — 36415 COLL VENOUS BLD VENIPUNCTURE: CPT | Performed by: PATHOLOGY

## 2024-05-10 PROCEDURE — 76830 TRANSVAGINAL US NON-OB: CPT | Mod: GC | Performed by: RADIOLOGY

## 2024-05-10 PROCEDURE — 99000 SPECIMEN HANDLING OFFICE-LAB: CPT | Performed by: PATHOLOGY

## 2024-05-13 LAB — INHIBIN B SERPL-MCNC: 48 PG/ML

## 2024-05-15 NOTE — PROGRESS NOTES
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    Today she reports doing well, LMP 24.   That cycle was earlier then expected, no intermenstrual bleeding, no pelvic pain. Overall is doing well. She will follow up with her PCP for annual exam including Pap smear.     Work up to date  She had a year of amenorrhea and noted the ovarian cyst increased in size between May and 2022. Since the recent surgery, her menstrual cycle returned, she is at the end of her cycle. She feels like she is recovering well from surgery, no pain. She feels good about her fertility and is currently not planning for pregnancy, she does want to retain ovarian function if it is safe    She presented to Dr. Corley (OB/GYN) for concerns of amenorrhea and a pelvic mass in 10/222. She had a LMP in 2021.     2022 Pelvic US- 12.7 x 12.3 cm complex left ovarian mass, endometrial thickness of 12.5 mm.    10/11/22 Pelvic US - 18 x 12.8 x 8.5 cm    22 Surgery: Laparoscopy, Diagnostic, Gyn, Left Salpingo-Oophorectomy (intraoperative finding of smooth exterior, the mass was drained with needle drain, cut in two and removed via endo catch bag)   Cytology: Negative for malignancy  Pathology: Final Diagnosis  A. LEFT OVARY, OOPHORECTOMY  --ADULT GRANULOSA CELL TUMOR  --SEE COMMENT    23 Inhibin B 13    23 Endometrial Biopsy  Final Diagnosis  Endometrium, biopsy:  - Weakly proliferative endometrium with focal stromal breakdown  - Negative for hyperplasia, atypia or malignancy    23 CT A/P  IMPRESSION:   1. Previously noted left ovarian mass no longer visualized, presumably surgically absent.   Grossly unremarkable appearance of the right ovary. 2. Trace pelvic free fluid likely physiologic.    23 Inhibin B 72     23 Pelvic US  Right Ovary 3.7 x 3.8 x 2.8 cm, two complex cyst 1.8 x 1.7 cm and 2.3 x 2 cm     23 Inhibin B 12  23 Pelvic US  FINDINGS: 2 complex area on the right ovary measuring 1.8 x 1.7 x 1.6 cm and 2.2 x 2 x 1.7 cm. The endometrial stripe is 7.2 mm with some very small hyperechoic areas adjacent to the anterior endometrial cavity.    23 Inhibin B  39    5/10/24 Inhibin B  48  5/10/24 Pelvic US  Normal Pelvic US      OBGYN history and Health Maintenance:    Last Pap Smear: no abnormal history, last one 5 yrs ago, due for screening      Past Medical History:   Diagnosis Date    Granulosa cell tumor        Past Surgical History:   Procedure Laterality Date    OTHER SURGICAL HISTORY      67497.0,PAST SURGICAL HISTORY,Unremarkable       No current outpatient medications on file.       No Known Allergies    Social History:  Social History     Tobacco Use    Smoking status: Never    Smokeless tobacco: Never   Substance Use Topics    Alcohol use: Not on file         Family History:   The patient's family history is notable for   Family History   Problem Relation Age of Onset    Leukemia Mother     Cancer Father     Deep Vein Thrombosis (DVT) Father     Family History Negative Other         Good Health    Genetic Disorder Other         Genetic         Physical Exam:   Virtual Visit  General: Alert and oriented, no acute distress  Psych: Mood stable      Assessment/Plan: Darcie Cason is a 39 year old woman with diagnosis of 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    She continues to do well, with no evidence of disease recurrence.    Most recent Inhibin B was right around her menses and the value has historically fluctuated depending on her cycles.    Follow up with PCP for annual exam and cervical cancer screening    Next visit in 6  months with CT scan abdomen and pelvis and Inhibin B value        Ashley Rodgers M.D., MPH,  F.A.C.O.G.  Division of Gynecologic Oncology  [facility]/[employer] [place]  [phone]      Time: total time spent today, 25 , including preparation, review of outside records, face to face counseling, and documentation.

## 2024-05-16 ENCOUNTER — VIRTUAL VISIT (OUTPATIENT)
Dept: ONCOLOGY | Facility: CLINIC | Age: 39
End: 2024-05-16
Attending: OBSTETRICS & GYNECOLOGY
Payer: COMMERCIAL

## 2024-05-16 VITALS — BODY MASS INDEX: 19.25 KG/M2 | WEIGHT: 127 LBS | HEIGHT: 68 IN

## 2024-05-16 DIAGNOSIS — D39.10 PRIMARY GRANULOSA CELL TUMOR OF OVARY, UNSPECIFIED LATERALITY: Primary | ICD-10-CM

## 2024-05-16 PROCEDURE — 99214 OFFICE O/P EST MOD 30 MIN: CPT | Mod: 95 | Performed by: OBSTETRICS & GYNECOLOGY

## 2024-05-16 ASSESSMENT — PAIN SCALES - GENERAL: PAINLEVEL: NO PAIN (0)

## 2024-05-16 NOTE — NURSING NOTE
Is the patient currently in the state of [place]? YES    Visit mode:VIDEO    If the visit is dropped, the patient can be reconnected by: VIDEO VISIT: Text to cell phone:   Telephone Information:   Mobile [phone]       Will anyone else be joining the visit? NO  (If patient encounters technical issues they should call [phone])    How would you like to obtain your AVS? MyChart    Are changes needed to the allergy or medication list? Pt stated no changes to allergies and Pt stated no med changes    Are refills needed on medications prescribed by this physician? NO    Reason for visit: RECHECK    Shelly COTE

## 2024-05-16 NOTE — LETTER
2024         RE: Darcie Cason  [id number]  [facility] [address]        Dear Colleague,    Thank you for referring your patient, Darcie Cason, to the [facility]. Please see a copy of my visit note below.    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    Today she reports doing well, LMP 24.   That cycle was earlier then expected, no intermenstrual bleeding, no pelvic pain. Overall is doing well. She will follow up with her PCP for annual exam including Pap smear.     Work up to date  She had a year of amenorrhea and noted the ovarian cyst increased in size between May and 2022. Since the recent surgery, her menstrual cycle returned, she is at the end of her cycle. She feels like she is recovering well from surgery, no pain. She feels good about her fertility and is currently not planning for pregnancy, she does want to retain ovarian function if it is safe    She presented to Dr. Corley (OB/GYN) for concerns of amenorrhea and a pelvic mass in 10/222. She had a LMP in 2021.     2022 Pelvic US- 12.7 x 12.3 cm complex left ovarian mass, endometrial thickness of 12.5 mm.    10/11/22 Pelvic US - 18 x 12.8 x 8.5 cm    22 Surgery: Laparoscopy, Diagnostic, Gyn, Left Salpingo-Oophorectomy (intraoperative finding of smooth exterior, the mass was drained with needle drain, cut in two and removed via endo catch bag)   Cytology: Negative for malignancy  Pathology: Final Diagnosis  A. LEFT OVARY, OOPHORECTOMY  --ADULT GRANULOSA CELL TUMOR  --SEE COMMENT    23 Inhibin B 13    23 Endometrial Biopsy  Final Diagnosis  Endometrium,  biopsy:  - Weakly proliferative endometrium with focal stromal breakdown  - Negative for hyperplasia, atypia or malignancy    23 CT A/P  IMPRESSION:   1. Previously noted left ovarian mass no longer visualized, presumably surgically absent.  Grossly unremarkable appearance of the right ovary. 2. Trace pelvic free fluid likely physiologic.    23 Inhibin B 72     23 Pelvic US  Right Ovary 3.7 x 3.8 x 2.8 cm, two complex cyst 1.8 x 1.7 cm and 2.3 x 2 cm     23 Inhibin B 12  23 Pelvic US  FINDINGS: 2 complex area on the right ovary measuring 1.8 x 1.7 x 1.6 cm and 2.2 x 2 x 1.7 cm. The endometrial stripe is 7.2 mm with some very small hyperechoic areas adjacent to the anterior endometrial cavity.    23 Inhibin B  39    5/10/24 Inhibin B  48  5/10/24 Pelvic US  Normal Pelvic US      OBGYN history and Health Maintenance:    Last Pap Smear: no abnormal history, last one 5 yrs ago, due for screening      Past Medical History:   Diagnosis Date    Granulosa cell tumor        Past Surgical History:   Procedure Laterality Date    OTHER SURGICAL HISTORY      12514.0,PAST SURGICAL HISTORY,Unremarkable       No current outpatient medications on file.       No Known Allergies    Social History:  Social History     Tobacco Use    Smoking status: Never    Smokeless tobacco: Never   Substance Use Topics    Alcohol use: Not on file         Family History:   The patient's family history is notable for   Family History   Problem Relation Age of Onset    Leukemia Mother     Cancer Father     Deep Vein Thrombosis (DVT) Father     Family History Negative Other         Good Health    Genetic Disorder Other         Genetic         Physical Exam:   Virtual Visit  General: Alert and oriented, no acute distress  Psych: Mood stable      Assessment/Plan: Darcie Cason is a 39 year old woman with diagnosis of 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    She continues to do well, with  no evidence of disease recurrence.    Most recent Inhibin B was right around her menses and the value has historically fluctuated depending on her cycles.    Follow up with PCP for annual exam and cervical cancer screening    Next visit in 6 months with CT scan abdomen and pelvis and Inhibin B value        Ashley Rodgers M.D., MPH,  F.A.C.O.G.  Division of Gynecologic Oncology  [facility]/[employer] [place]  [phone]      Time: total time spent today, 25 , including preparation, review of outside records, face to face counseling, and documentation.

## 2024-09-21 ENCOUNTER — HEALTH MAINTENANCE LETTER (OUTPATIENT)
Age: 39
End: 2024-09-21

## 2024-12-05 ENCOUNTER — VIRTUAL VISIT (OUTPATIENT)
Dept: ONCOLOGY | Facility: CLINIC | Age: 39
End: 2024-12-05
Attending: OBSTETRICS & GYNECOLOGY
Payer: COMMERCIAL

## 2024-12-05 VITALS — HEIGHT: 68 IN | BODY MASS INDEX: 19.7 KG/M2 | WEIGHT: 130 LBS

## 2024-12-05 DIAGNOSIS — D39.10 PRIMARY GRANULOSA CELL TUMOR OF OVARY, UNSPECIFIED LATERALITY: Primary | ICD-10-CM

## 2024-12-05 ASSESSMENT — PAIN SCALES - GENERAL: PAINLEVEL_OUTOF10: NO PAIN (0)

## 2024-12-05 NOTE — NURSING NOTE
Current patient location: [facility] [address]  [facility] [address]    Is the patient currently in the state of [place]? YES    Visit mode:VIDEO    If the visit is dropped, the patient can be reconnected by:VIDEO VISIT: Text to cell phone:   Telephone Information:   Mobile [phone]       Will anyone else be joining the visit? NO  (If patient encounters technical issues they should call [phone])    Are changes needed to the allergy or medication list? No    Are refills needed on medications prescribed by this physician? NO    Rooming Documentation:  Unable to complete questionnaire(s) due to time    Reason for visit: TIFFANY COTE

## 2024-12-05 NOTE — PROGRESS NOTES
Virtual Visit Details    Type of service:  Video Visit     Originating Location (pt. 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    Today she reports doing well, LMP 24.  The cycle in October was short at 19 days, otherwise normal. No intermenstrual bleeding, no pelvic pain. Overall is doing well.     Work up to date  She had a year of amenorrhea and noted the ovarian cyst increased in size between May and 2022. Since the recent surgery, her menstrual cycle returned, she is at the end of her cycle. She feels like she is recovering well from surgery, no pain. She feels good about her fertility and is currently not planning for pregnancy, she does want to retain ovarian function if it is safe    She presented to Dr. Corley (OB/GYN) for concerns of amenorrhea and a pelvic mass in 10/222. She had a LMP in 2021.     2022 Pelvic US- 12.7 x 12.3 cm complex left ovarian mass, endometrial thickness of 12.5 mm.    10/11/22 Pelvic US - 18 x 12.8 x 8.5 cm    22 Surgery: Laparoscopy, Diagnostic, Gyn, Left Salpingo-Oophorectomy (intraoperative finding of smooth exterior, the mass was drained with needle drain, cut in two and removed via endo catch bag)   Cytology: Negative for malignancy  Pathology: Final Diagnosis  A. LEFT OVARY, OOPHORECTOMY  --ADULT GRANULOSA CELL TUMOR  --SEE COMMENT    23 Inhibin B 13    23 Endometrial Biopsy  Final Diagnosis  Endometrium, biopsy:  - Weakly proliferative endometrium with focal stromal breakdown  - Negative for  hyperplasia, atypia or malignancy    23 CT A/P  IMPRESSION:   1. Previously noted left ovarian mass no longer visualized, presumably surgically absent.  Grossly unremarkable appearance of the right ovary. 2. Trace pelvic free fluid likely physiologic.    23 Inhibin B 72     23 Pelvic US  Right Ovary 3.7 x 3.8 x 2.8 cm, two complex cyst 1.8 x 1.7 cm and 2.3 x 2 cm     23 Inhibin B 12  23 Pelvic US  FINDINGS: 2 complex area on the right ovary measuring 1.8 x 1.7 x 1.6 cm and 2.2 x 2 x 1.7 cm. The endometrial stripe is 7.2 mm with some very small hyperechoic areas adjacent to the anterior endometrial cavity.    23 Inhibin B  39    5/10/24 Inhibin B  48  5/10/24 Pelvic US  Normal Pelvic US    11/15/24 Inhibin B 73    11/15/24 CT A& P  IMPRESSION:   Normal CT of the abdomen and pelvis.       OBGYN history and Health Maintenance:    Last Pap Smear: 24       Past Medical History:   Diagnosis Date    Granulosa cell tumor        Past Surgical History:   Procedure Laterality Date    OTHER SURGICAL HISTORY      29411.0,PAST SURGICAL HISTORY,Unremarkable       No current outpatient medications on file.       No Known Allergies    Social History:  Social History     Tobacco Use    Smoking status: Never    Smokeless tobacco: Never   Substance Use Topics    Alcohol use: Not on file         Family History:   The patient's family history is notable for   Family History   Problem Relation Age of Onset    Leukemia Mother     Cancer Father     Deep Vein Thrombosis (DVT) Father     Family History Negative Other         Good Health    Genetic Disorder Other         Genetic         Physical Exam:   Virtual Visit  General: Alert and oriented, no acute distress  Psych: Mood stable      Assessment/Plan: Darcie Cason is a 39 year old woman with diagnosis of 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    She continues to do well, with no evidence of disease  recurrence.    Most recent Inhibin B was right around her menses and the elevation reflects inflammatory changes. CT scan confirms no evidence of disease.    She can follow up with any new concerns.    Next visit in 6 months with Inhibin B value, and can trigger imaging depending on result after a clinic visit.      Ashley Rodgers M.D., MPH,  F.A.C.O.G.  Division of Gynecologic Oncology  [facility]/[employer] [place]  [phone]      Time: total time spent today, 20 , including preparation, review of outside records, face to face counseling, and documentation.

## 2024-12-05 NOTE — LETTER
2024      Darcie Cason  [address]  [facility] [address]      Dear Colleague,    Thank you for referring your patient, Darcie Cason, to the [facility]. Please see a copy of my visit note below.    Virtual Visit Details    Type of service:  Video Visit     Originating Location (pt. 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    Today she reports doing well, LMP 24.  The cycle in October was short at 19 days, otherwise normal. No intermenstrual bleeding, no pelvic pain. Overall is doing well.     Work up to date  She had a year of amenorrhea and noted the ovarian cyst increased in size between May and 2022. Since the recent surgery, her menstrual cycle returned, she is at the end of her cycle. She feels like she is recovering well from surgery, no pain. She feels good about her fertility and is currently not planning for pregnancy, she does want to retain ovarian function if it is safe    She presented to Dr. Corley (OB/GYN) for concerns of amenorrhea and a pelvic mass in 10/222. She had a LMP in 2021.     2022 Pelvic US- 12.7 x 12.3 cm complex left ovarian mass, endometrial thickness of 12.5 mm.    10/11/22 Pelvic US - 18 x 12.8 x 8.5 cm    22 Surgery: Laparoscopy, Diagnostic, Gyn, Left Salpingo-Oophorectomy (intraoperative finding of smooth exterior, the mass was drained with needle drain, cut in two and removed via endo catch bag)   Cytology: Negative for malignancy  Pathology: Final Diagnosis  A. LEFT OVARY,  OOPHORECTOMY  --ADULT GRANULOSA CELL TUMOR  --SEE COMMENT    23 Inhibin B 13    23 Endometrial Biopsy  Final Diagnosis  Endometrium, biopsy:  - Weakly proliferative endometrium with focal stromal breakdown  - Negative for hyperplasia, atypia or malignancy    23 CT A/P  IMPRESSION:   1. Previously noted left ovarian mass no longer visualized, presumably surgically absent.  Grossly unremarkable appearance of the right ovary. 2. Trace pelvic free fluid likely physiologic.    23 Inhibin B 72     23 Pelvic US  Right Ovary 3.7 x 3.8 x 2.8 cm, two complex cyst 1.8 x 1.7 cm and 2.3 x 2 cm     23 Inhibin B 12  23 Pelvic US  FINDINGS: 2 complex area on the right ovary measuring 1.8 x 1.7 x 1.6 cm and 2.2 x 2 x 1.7 cm. The endometrial stripe is 7.2 mm with some very small hyperechoic areas adjacent to the anterior endometrial cavity.    23 Inhibin B  39    5/10/24 Inhibin B  48  5/10/24 Pelvic US  Normal Pelvic US    11/15/24 Inhibin B 73    11/15/24 CT A& P  IMPRESSION:   Normal CT of the abdomen and pelvis.       OBGYN history and Health Maintenance:    Last Pap Smear: 24       Past Medical History:   Diagnosis Date     Granulosa cell tumor        Past Surgical History:   Procedure Laterality Date     OTHER SURGICAL HISTORY      81903.0,PAST SURGICAL HISTORY,Unremarkable       No current outpatient medications on file.       No Known Allergies    Social History:  Social History     Tobacco Use     Smoking status: Never     Smokeless tobacco: Never   Substance Use Topics     Alcohol use: Not on file         Family History:   The patient's family history is notable for   Family History   Problem Relation Age of Onset     Leukemia Mother      Cancer Father      Deep Vein Thrombosis (DVT) Father      Family History Negative Other         Good Health     Genetic Disorder Other         Genetic         Physical Exam:   Virtual Visit  General: Alert and oriented, no acute  distress  Psych: Mood stable      Assessment/Plan: Darcie Cason is a 39 year old woman with diagnosis of 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    She continues to do well, with no evidence of disease recurrence.    Most recent Inhibin B was right around her menses and the elevation reflects inflammatory changes. CT scan confirms no evidence of disease.    She can follow up with any new concerns.    Next visit in 6 months with Inhibin B value, and can trigger imaging depending on result after a clinic visit.      Ashley Rodgers M.D., MPH,  F.A.C.O.G.  Division of Gynecologic Oncology  [facility]/[employer]  [phone]      Time: total time spent today, 20 , including preparation, review of outside records, face to face counseling, and documentation.       Again, thank you for allowing me to participate in the care of your patient.        Sincerely,        Ashley Rodgers MD

## 2025-06-04 NOTE — PROGRESS NOTES
Virtual Visit Details    Type of service:  Video Visit     Originating Location (pt. 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    Today she reports doing well, LMP 25. Her cycles are regular some with shorter cycles, no abnormal bleeding. She has an established primary care clinic who does her Gyn exam.    Work up to date  She had a year of amenorrhea and noted the ovarian cyst increased in size between May and 2022. Since the recent surgery, her menstrual cycle returned, she is at the end of her cycle. She feels like she is recovering well from surgery, no pain. She feels good about her fertility and is currently not planning for pregnancy, she does want to retain ovarian function if it is safe    She presented to Dr. Corley (OB/GYN) for concerns of amenorrhea and a pelvic mass in 10/222. She had a LMP in 2021.     2022 Pelvic US- 12.7 x 12.3 cm complex left ovarian mass, endometrial thickness of 12.5 mm.    10/11/22 Pelvic US - 18 x 12.8 x 8.5 cm    22 Surgery: Laparoscopy, Diagnostic, Gyn, Left Salpingo-Oophorectomy (intraoperative finding of smooth exterior, the mass was drained with needle drain, cut in two and removed via endo catch bag)   Cytology: Negative for malignancy  Pathology: Final Diagnosis  A. LEFT OVARY, OOPHORECTOMY  --ADULT GRANULOSA CELL TUMOR  --SEE COMMENT    23 Inhibin B 13    23 Endometrial Biopsy  Final Diagnosis  Endometrium, biopsy:  - Weakly proliferative endometrium with focal stromal breakdown  - Negative for  "hyperplasia, atypia or malignancy    23 CT A/P  IMPRESSION:   1. Previously noted left ovarian mass no longer visualized, presumably surgically absent.  Grossly unremarkable appearance of the right ovary. 2. Trace pelvic free fluid likely physiologic.    23 Inhibin B 72     23 Pelvic US  Right Ovary 3.7 x 3.8 x 2.8 cm, two complex cyst 1.8 x 1.7 cm and 2.3 x 2 cm     23 Inhibin B 12  23 Pelvic US  FINDINGS: 2 complex area on the right ovary measuring 1.8 x 1.7 x 1.6 cm and 2.2 x 2 x 1.7 cm. The endometrial stripe is 7.2 mm with some very small hyperechoic areas adjacent to the anterior endometrial cavity.    23 Inhibin B  39    5/10/24 Inhibin B  48  5/10/24 Pelvic US  Normal Pelvic US    11/15/24 Inhibin B 73    11/15/24 CT A& P  IMPRESSION:   Normal CT of the abdomen and pelvis.   5/15/24 Inhibin B 64      OBGYN history and Health Maintenance:    Last Pap Smear: 24       Past Medical History:   Diagnosis Date    Granulosa cell tumor        Past Surgical History:   Procedure Laterality Date    OTHER SURGICAL HISTORY      95692.0,PAST SURGICAL HISTORY,Unremarkable       No current outpatient medications on file.       No Known Allergies    Social History:  Social History     Tobacco Use    Smoking status: Never    Smokeless tobacco: Never   Substance Use Topics    Alcohol use: Not on file         Family History:   The patient's family history is notable for   Family History   Problem Relation Age of Onset    Leukemia Mother     Cancer Father     Deep Vein Thrombosis (DVT) Father     Family History Negative Other         Good Health    Genetic Disorder Other         Genetic         Physical Exam:   Ht 1.727 m (5' 8\")   Wt 59 kg (130 lb)   BMI 19.77 kg/m      General: Alert and oriented, no acute distress  Psych: Mood stable      Assessment/Plan: Darcie Cason is a 40 year old woman with diagnosis of 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    She continues to do well, with no evidence of disease recurrence. The Inhibin value is likely her normal range in the pre-menopausal stage    She will transition to follow up with her local clinic every 6 months to complete 5 year of follow up. Continue to obtain Inhibin B with each visit and Pelvic US as needed    She will follow up with us or her local clinic with new concerns of abnormal cycles, bleeding or pain.     Ashley Rodgers M.D., MPH,  F.A.C.O.G.  Division of Gynecologic Oncology  [facility]/[employer] [place]  [phone]      Time: total time spent today, 20 , including preparation, review of outside records, face to face counseling, and documentation.

## 2025-06-05 ENCOUNTER — VIRTUAL VISIT (OUTPATIENT)
Dept: ONCOLOGY | Facility: CLINIC | Age: 40
End: 2025-06-05
Attending: OBSTETRICS & GYNECOLOGY
Payer: COMMERCIAL

## 2025-06-05 VITALS — WEIGHT: 130 LBS | HEIGHT: 68 IN | BODY MASS INDEX: 19.7 KG/M2

## 2025-06-05 DIAGNOSIS — D39.10 PRIMARY GRANULOSA CELL TUMOR OF OVARY, UNSPECIFIED LATERALITY: Primary | ICD-10-CM

## 2025-06-05 ASSESSMENT — PAIN SCALES - GENERAL: PAINLEVEL_OUTOF10: NO PAIN (0)

## 2025-06-05 NOTE — LETTER
2025      Darcie Cason  [address]  [facility] [address]      Dear Colleague,    Thank you for referring your patient, Darcie Cason, to the [facility]. Please see a copy of my visit note below.    Virtual Visit Details    Type of service:  Video Visit     Originating Location (pt. 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    Today she reports doing well, LMP 25. Her cycles are regular some with shorter cycles, no abnormal bleeding. She has an established primary care clinic who does her Gyn exam.    Work up to date  She had a year of amenorrhea and noted the ovarian cyst increased in size between May and 2022. Since the recent surgery, her menstrual cycle returned, she is at the end of her cycle. She feels like she is recovering well from surgery, no pain. She feels good about her fertility and is currently not planning for pregnancy, she does want to retain ovarian function if it is safe    She presented to Dr. Corley (OB/GYN) for concerns of amenorrhea and a pelvic mass in 10/222. She had a LMP in 2021.     2022 Pelvic US- 12.7 x 12.3 cm complex left ovarian mass, endometrial thickness of 12.5 mm.    10/11/22 Pelvic US - 18 x 12.8 x 8.5 cm    22 Surgery: Laparoscopy, Diagnostic, Gyn, Left Salpingo-Oophorectomy (intraoperative finding of smooth exterior, the mass was drained with needle drain, cut in two and removed via endo catch bag)   Cytology: Negative for malignancy  Pathology: Final Diagnosis  A. LEFT OVARY,  "OOPHORECTOMY  --ADULT GRANULOSA CELL TUMOR  --SEE COMMENT    23 Inhibin B 13    23 Endometrial Biopsy  Final Diagnosis  Endometrium, biopsy:  - Weakly proliferative endometrium with focal stromal breakdown  - Negative for hyperplasia, atypia or malignancy    23 CT A/P  IMPRESSION:   1. Previously noted left ovarian mass no longer visualized, presumably surgically absent.  Grossly unremarkable appearance of the right ovary. 2. Trace pelvic free fluid likely physiologic.    23 Inhibin B 72     23 Pelvic US  Right Ovary 3.7 x 3.8 x 2.8 cm, two complex cyst 1.8 x 1.7 cm and 2.3 x 2 cm     23 Inhibin B 12  23 Pelvic US  FINDINGS: 2 complex area on the right ovary measuring 1.8 x 1.7 x 1.6 cm and 2.2 x 2 x 1.7 cm. The endometrial stripe is 7.2 mm with some very small hyperechoic areas adjacent to the anterior endometrial cavity.    23 Inhibin B  39    5/10/24 Inhibin B  48  5/10/24 Pelvic US  Normal Pelvic US    11/15/24 Inhibin B 73    11/15/24 CT A& P  IMPRESSION:   Normal CT of the abdomen and pelvis.   5/15/24 Inhibin B 64      OBGYN history and Health Maintenance:    Last Pap Smear: 26       Past Medical History:   Diagnosis Date     Granulosa cell tumor        Past Surgical History:   Procedure Laterality Date     OTHER SURGICAL HISTORY      62154.0,PAST SURGICAL HISTORY,Unremarkable       No current outpatient medications on file.       No Known Allergies    Social History:  Social History     Tobacco Use     Smoking status: Never     Smokeless tobacco: Never   Substance Use Topics     Alcohol use: Not on file         Family History:   The patient's family history is notable for   Family History   Problem Relation Age of Onset     Leukemia Mother      Cancer Father      Deep Vein Thrombosis (DVT) Father      Family History Negative Other         Good Health     Genetic Disorder Other         Genetic         Physical Exam:   Ht 1.727 m (5' 8\")   Wt 59 kg (130 lb)   " BMI 19.77 kg/m      General: Alert and oriented, no acute distress  Psych: Mood stable      Assessment/Plan: Darcie Cason is a 40 year old woman with diagnosis of 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    She continues to do well, with no evidence of disease recurrence. The Inhibin value is likely her normal range in the pre-menopausal stage    She will transition to follow up with her local clinic every 6 months to complete 5 year of follow up. Continue to obtain Inhibin B with each visit and Pelvic US as needed    She will follow up with us or her local clinic with new concerns of abnormal cycles, bleeding or pain.     Ashley Rodgers M.D., MPH,  F.A.C.O.G.  Division of Gynecologic Oncology  [facility]  [phone]      Time: total time spent today, 20 , including preparation, review of outside records, face to face counseling, and documentation.       Again, thank you for allowing me to participate in the care of your patient.        Sincerely,        Ashley Rodgers MD    Electronically signed

## 2025-06-05 NOTE — NURSING NOTE
Current patient location: [facility] [address]  [facility] [address]    Is the patient currently in the state of [place]? YES    Visit mode: VIDEO    If the visit is dropped, the patient can be reconnected by:VIDEO VISIT: Text to cell phone:   Telephone Information:   Mobile [phone]       Will anyone else be joining the visit? NO  (If patient encounters technical issues they should call [phone])    Are changes needed to the allergy or medication list? No    Are refills needed on medications prescribed by this physician? NO    Rooming Documentation:  Unable to complete questionnaire(s) due to time    Reason for visit: TIFFANY COTE